# Patient Record
Sex: FEMALE | URBAN - METROPOLITAN AREA
[De-identification: names, ages, dates, MRNs, and addresses within clinical notes are randomized per-mention and may not be internally consistent; named-entity substitution may affect disease eponyms.]

---

## 2018-12-12 ENCOUNTER — IMAGING SERVICES (OUTPATIENT)
Dept: GENERAL RADIOLOGY | Age: 23
End: 2018-12-12

## 2018-12-12 ENCOUNTER — HOSPITAL (OUTPATIENT)
Dept: OTHER | Age: 23
End: 2018-12-12

## 2019-07-25 ENCOUNTER — APPOINTMENT (OUTPATIENT)
Age: 24
Setting detail: DERMATOLOGY
End: 2019-07-30

## 2019-07-25 DIAGNOSIS — L71.8 OTHER ROSACEA: ICD-10-CM

## 2019-07-25 PROCEDURE — OTHER COUNSELING: OTHER

## 2019-07-25 PROCEDURE — OTHER PRESCRIPTION MEDICATION MANAGEMENT: OTHER

## 2019-07-25 PROCEDURE — 99202 OFFICE O/P NEW SF 15 MIN: CPT

## 2019-07-25 PROCEDURE — OTHER PRESCRIPTION: OTHER

## 2019-07-25 RX ORDER — IVERMECTIN 10 MG/G
CREAM TOPICAL QAM
Qty: 45 | Refills: 11 | Status: ERX | COMMUNITY
Start: 2019-07-25

## 2019-07-25 ASSESSMENT — LOCATION SIMPLE DESCRIPTION DERM
LOCATION SIMPLE: NOSE
LOCATION SIMPLE: LEFT CHEEK
LOCATION SIMPLE: RIGHT CHEEK

## 2019-07-25 ASSESSMENT — LOCATION ZONE DERM
LOCATION ZONE: FACE
LOCATION ZONE: NOSE

## 2019-07-25 ASSESSMENT — LOCATION DETAILED DESCRIPTION DERM
LOCATION DETAILED: LEFT CENTRAL MALAR CHEEK
LOCATION DETAILED: RIGHT CENTRAL MALAR CHEEK
LOCATION DETAILED: NASAL SUPRATIP

## 2019-07-25 NOTE — HPI: RASH (ROSACEA)
How Severe Is Your Rosacea?: moderate
Is This A New Presentation, Or A Follow-Up?: Rosacea
Additional History: Patient had not had prescription treatment. Patient states she’s had the  redness component since childhood.

## 2019-07-25 NOTE — PROCEDURE: PRESCRIPTION MEDICATION MANAGEMENT
Plan: Will consider Mirvaso/rhofade at follow up, patient can also consider laser treatment for redness.
Detail Level: Zone
Initiate Treatment: Soolantra QAM
Otc Regimen: Sunscreen SPF 50 or higher (cerave AM)
Render In Strict Bullet Format?: No
Samples Given: Soolantra\\nCerave AM

## 2019-08-09 ENCOUNTER — RX ONLY (RX ONLY)
Age: 24
End: 2019-08-09

## 2019-08-09 RX ORDER — METRONIDAZOLE 10 MG/G
GEL TOPICAL QD
Qty: 60 | Refills: 11 | Status: ERX | COMMUNITY
Start: 2019-08-09

## 2021-10-31 ENCOUNTER — LAB ENCOUNTER (OUTPATIENT)
Dept: LAB | Facility: HOSPITAL | Age: 26
End: 2021-10-31
Attending: PLASTIC SURGERY
Payer: COMMERCIAL

## 2021-10-31 DIAGNOSIS — Z01.818 PRE-PROCEDURAL EXAMINATION: ICD-10-CM

## 2022-03-18 ENCOUNTER — APPOINTMENT (OUTPATIENT)
Age: 27
Setting detail: DERMATOLOGY
End: 2022-03-18

## 2022-03-18 DIAGNOSIS — D22 MELANOCYTIC NEVI: ICD-10-CM

## 2022-03-18 DIAGNOSIS — Z12.83 ENCOUNTER FOR SCREENING FOR MALIGNANT NEOPLASM OF SKIN: ICD-10-CM

## 2022-03-18 DIAGNOSIS — L71.8 OTHER ROSACEA: ICD-10-CM

## 2022-03-18 DIAGNOSIS — L81.4 OTHER MELANIN HYPERPIGMENTATION: ICD-10-CM

## 2022-03-18 PROBLEM — D22.61 MELANOCYTIC NEVI OF RIGHT UPPER LIMB, INCLUDING SHOULDER: Status: ACTIVE | Noted: 2022-03-18

## 2022-03-18 PROBLEM — D22.5 MELANOCYTIC NEVI OF TRUNK: Status: ACTIVE | Noted: 2022-03-18

## 2022-03-18 PROBLEM — D23.71 OTHER BENIGN NEOPLASM OF SKIN OF RIGHT LOWER LIMB, INCLUDING HIP: Status: ACTIVE | Noted: 2022-03-18

## 2022-03-18 PROCEDURE — 99214 OFFICE O/P EST MOD 30 MIN: CPT

## 2022-03-18 PROCEDURE — OTHER EDUCATIONAL RESOURCES PROVIDED: OTHER

## 2022-03-18 PROCEDURE — OTHER PRESCRIPTION MEDICATION MANAGEMENT: OTHER

## 2022-03-18 PROCEDURE — OTHER COUNSELING: OTHER

## 2022-03-18 PROCEDURE — OTHER PRESCRIPTION: OTHER

## 2022-03-18 RX ORDER — OXYMETAZOLINE HYDROCHLORIDE 1 G/100G
CREAM TOPICAL QAM
Qty: 30 | Refills: 3 | Status: ERX | COMMUNITY
Start: 2022-03-18

## 2022-03-18 RX ORDER — OXYMETAZOLINE HYDROCHLORIDE 1 G/100G
CREAM TOPICAL QAM
Qty: 30 | Refills: 3 | Status: ERX

## 2022-03-18 ASSESSMENT — LOCATION DETAILED DESCRIPTION DERM
LOCATION DETAILED: RIGHT DISTAL POSTERIOR UPPER ARM
LOCATION DETAILED: SUPERIOR THORACIC SPINE

## 2022-03-18 ASSESSMENT — LOCATION ZONE DERM
LOCATION ZONE: TRUNK
LOCATION ZONE: ARM

## 2022-03-18 ASSESSMENT — LOCATION SIMPLE DESCRIPTION DERM
LOCATION SIMPLE: RIGHT POSTERIOR UPPER ARM
LOCATION SIMPLE: UPPER BACK

## 2022-03-18 NOTE — HPI: SKIN LESION
How Severe Is Your Skin Lesion?: moderate
Has Your Skin Lesion Been Treated?: not been treated
Is This A New Presentation, Or A Follow-Up?: Skin Lesion
Additional History: Patient reports that she feels the boarders have changed recently.